# Patient Record
Sex: FEMALE | Race: WHITE
[De-identification: names, ages, dates, MRNs, and addresses within clinical notes are randomized per-mention and may not be internally consistent; named-entity substitution may affect disease eponyms.]

---

## 2018-11-05 ENCOUNTER — HOSPITAL ENCOUNTER (EMERGENCY)
Dept: HOSPITAL 25 - OHCORT | Age: 43
Discharge: HOME | End: 2018-11-05
Payer: SELF-PAY

## 2018-11-05 DIAGNOSIS — Z01.811: Primary | ICD-10-CM

## 2018-11-05 NOTE — XMS REPORT
Asha Levin

 Created on:October 10, 2018



Patient:Asha Levin

Sex:Female

:1975

External Reference #:[id number]





Demographics







 Address  [address] [place] [address]

 

 Home Phone  [phone]

 

 Mobile Phone  [phone]

 

 Email Address  [email or web address]

 

 Preferred Language  English

 

 Marital Status  Unknown

 

 Sabianist Affiliation  Unknown

 

 Race  White

 

 Ethnic Group  Unknown









Author







 Organization  [facility]

 

 Address  [address], [address]



   [place] [address]

 

 Phone  [phone]









Support







 Name  Relationship  Address  Phone

 

 Jc Gentile  Significant Other  [address] [facility] [facility] [address]  [phone]



     [place] [address]  









Care Team Providers







 Name  Role  Phone

 

 Carrie Blackmon, ALVINA, SERINA, Ibanibal  Care Team Information   
Unavailable

 

 Carrie Blackmon PNP-BC, KAYDENP, Ibclc  Primary Care Physician  Unavailable









Payers







 Type  Date  Identification Numbers  Payment Provider  Subscriber

 

 Commercial    PayID: [id number]  Self-Pay  Asha Levin

 

 Medicaid  Expires: 2018  Policy Number: [id number]  Medicaid  Asha Levin









 PayID: [id number]   [address]









 [place] [address]









 Medicaid  Expires: 2018  Policy Number: [id number]  Medicaid  Asha Levin









 Group Name: 1 1   [address]

 

 PayID: [id number]  [place] [address]









 Medicaid  Expires: 2018  Policy Number: [id number]  Medicaid  Asha Levin









 Group Name: 1 1   [address]

 

 PayID: [id number]  [place] [address]







Problems







 Date  Description  Provider  Status

 

 Onset: 10/04/2018  Hyperlipidemia  Carrie Blackmon PNP-BC, KAYDENP, Ibclc  Active







Family History







 Date  Family Member(s)  Problem(s)  Comments

 

 :  (age 56 Years)  Mother   due to Lung Cancer  

 

 :  (age 81 Years)  Maternal Grandfather   due to Influenza  

 

 :  (age 55 Years)  Maternal Grandmother   due to Congestive  



     Heart Failure  







Social History







 Type  Date  Description  Comments

 

 ETOH Use    Rarely consumes alcohol  

 

 Smoking    Light tobacco smoker (10 or fewer cigarettes/day)  







Allergies, Adverse Reactions, Alerts







 Date  Description  Reaction  Status  Severity  Comments

 

 10/04/2018  NKDA    active    







Medications







 Medication  Date  Status  Form  Strength  Qnty  SIG  Indications  Ordering



                 Provider

 

 No Active  10/04/2018  Active            Unknown



 Medications                







Vital Signs







 Date  Vital  Result  Comment

 

 10/04/2018  BP Systolic Sitting Left Arm  112 mmHg  









 BP Diastolic Sitting Left Arm  64 mmHg  

 

 Body Temperature  98.3 F  

 

 Heart Rate  69 /min  

 

 Height  64 inches  5'4"

 

 Weight  206.00 lb  

 

 BMI (Body Mass Index)  35.4 kg/m2  

 

 BSA (Body Surface Area)  1.98 m2  

 

 Ideal body weight in kilograms  54  

 

 O2 % BldC Oximetry  93 %  







Results







 Test  Date  Test  Result  H/L  Range  Note

 

 Urine Dipstick  10/04/2018  Ua Color  yellow    Yellow  









 Ua Clarity  clear    Clear  

 

 Ua Leuko  trace    Negative  

 

 Ua Nitrite  negative    Negative  

 

 Ua Urobilinogen  0.2    0.2 - 1.0 E.U./dL  

 

 Ua Protein  negative    Negative  

 

 Ua PH  6.5    6.5-7.5  

 

 Ua Blood  negative    Negative  

 

 Ua Specific Gravity  1.020    1.010-1.030  

 

 Ua Ketones  negative    Negative  

 

 Ua Bilirubin  negative    Negative  

 

 Ua Glucose  negative    Negative  







Procedures







 Date  CPT Code  Description  Status

 

 1994  67934  Vaginal Delivery W/Post-Partum Care  Completed

 

 1994  93734  Non-Stress Test (NST)  Completed

 

 1994  75108  Non-Stress Test (NST)  Completed







Encounters







 Type  Date  Location  Provider  CPT E/M  Dx

 

 Office Visit  10/04/2018 12:30p  Family Medicine  Carrie Blackmon PNP-BC,  
[id number]  Z12.31



       FN, Ibclc    









 Z30.8

 

 F41.9

 

 E78.5

 

 F17.210







Plan of Care

10/04/2018 - Carrie Blackmon PNP-BC, FNP, YaxxzF18.31 Encntr screen mammogram 
for malignant neoplasm of breastNew Xrays:Mammography, Screening Bilateral 
MammogramComments:pt given information for [facility] to help her 
pay for the mammogram. she understands the urgency to not ignore this.  It is 
likely a benign issue b/c of the tenderness but it's better that we make 
sure.Z30.8 Encounter for other contraceptive managementComments:[facility] 
for your IUD removal and replacement with a pap smear. [phone] Anxiety 
disorder, unspecifiedComments:stable at this time.  if you feel like you need 
medication let me know.E78.5 Hyperlipidemia, unspecifiedComments:once you get 
insurance you should come back and we should do a lot of baseline screening 
stuff and recheck your cholesterol.F17.210 Nicotine dependence, cigarettes, 
uncomplicatedComments:keep trying on your own.  once you get insurance come on 
back and we can help you get the chantix that worked for you in the past.AllNew 
Medication:No Active MedicationsComments:[place] [phone]